# Patient Record
Sex: MALE | Race: WHITE | NOT HISPANIC OR LATINO | Employment: FULL TIME | URBAN - METROPOLITAN AREA
[De-identification: names, ages, dates, MRNs, and addresses within clinical notes are randomized per-mention and may not be internally consistent; named-entity substitution may affect disease eponyms.]

---

## 2022-04-02 ENCOUNTER — OFFICE VISIT (OUTPATIENT)
Dept: URGENT CARE | Facility: URGENT CARE | Age: 27
End: 2022-04-02
Payer: OTHER MISCELLANEOUS

## 2022-04-02 ENCOUNTER — ANCILLARY PROCEDURE (OUTPATIENT)
Dept: GENERAL RADIOLOGY | Facility: CLINIC | Age: 27
End: 2022-04-02
Attending: FAMILY MEDICINE

## 2022-04-02 VITALS
HEART RATE: 55 BPM | SYSTOLIC BLOOD PRESSURE: 114 MMHG | OXYGEN SATURATION: 98 % | DIASTOLIC BLOOD PRESSURE: 64 MMHG | WEIGHT: 211.2 LBS | TEMPERATURE: 97.7 F

## 2022-04-02 DIAGNOSIS — S69.91XA INJURY OF FINGER OF RIGHT HAND, INITIAL ENCOUNTER: ICD-10-CM

## 2022-04-02 DIAGNOSIS — S62.666A CLOSED NONDISPLACED FRACTURE OF DISTAL PHALANX OF RIGHT LITTLE FINGER, INITIAL ENCOUNTER: Primary | ICD-10-CM

## 2022-04-02 PROCEDURE — 99203 OFFICE O/P NEW LOW 30 MIN: CPT | Performed by: FAMILY MEDICINE

## 2022-04-02 PROCEDURE — 73140 X-RAY EXAM OF FINGER(S): CPT | Mod: RT | Performed by: RADIOLOGY

## 2022-04-02 NOTE — PROGRESS NOTES
Assessment & Plan     Injury of finger of right hand, initial encounter   Closed nondisplaced fracture of distal phalanx of right little finger, initial encounter  (primary encounter diagnosis)  Comment: Differential discussed in detail.  X-ray findings reviewed independently, consistent with distal right fifth digit fracture.  Finger splint applied and suggested rest, icing, elevation and over-the-counter analgesia.  Orthopedic referral placed for further review and recommendations.  All questions answered.  Plan: XR Finger Right G/E 2 Views, Orthopedic          Referral          Jorge Luis Fraser MD  [facility]    Fern Rudd is a 26 year old who presents for the following health issues     HPI     Concern -   Onset: yesterday   Description: dropped a box on his right little finger yesterday at work, about 60 pounds  Intensity: moderate  Progression of Symptoms:  same  Accompanying Signs & Symptoms: none  Therapies tried and outcome: None      Review of Systems   Constitutional, HEENT, cardiovascular, pulmonary, gi and gu systems are negative, except as otherwise noted.      Objective    /64   Pulse 55   Temp 97.7  F (36.5  C) (Tympanic)   Wt 95.8 kg (211 lb 3.2 oz)   SpO2 98%   There is no height or weight on file to calculate BMI.  Physical Exam   GENERAL: alert and no distress  MS: Right hand fifth finger mildly swollen with some bruising and tenderness, range of movement normal, normal capillary refills,  SKIN: As above  NEURO: Normal strength and tone, mentation intact and speech normal  PSYCH: mentation appears normal, affect normal/bright    Results for orders placed or performed in visit on 04/02/22   XR Finger Right G/E 2 Views     Status: None (Preliminary result)    Narrative    EXAM: XR FINGER RIGHT G/E 2 VIEW  LOCATION: [facility]  DATE/TIME: 04/02/2022, 12:10 PM    INDICATION: Injury of finger of right hand,  initial encounter.  COMPARISON: None.      Impression    IMPRESSION: Nondisplaced fracture distal phalanx right small finger. No definitive intra-articular extension. No additional small finger fracture or dislocation. Normal joint spaces. Slight flexion at the PIP joint small finger.

## 2022-04-03 ENCOUNTER — HEALTH MAINTENANCE LETTER (OUTPATIENT)
Age: 27
End: 2022-04-03

## 2022-04-04 NOTE — PROGRESS NOTES
Tobin Newell is a 26 year old year old male who presents for evaluation and management of a right side 5th finger injury.    Mechansim: a box fell on his right 5th finger    The injury occurred on 4/1/22    He reports having mild pain/discomfort around the injury site.    Bumps it alot    Treatment:  Treatment: splint, at     Past medical history: See patient history on EMR     Physical Exam  The splint/cast was removed  moderate swelling of the finger.  no deformity of the finger  Tenderness over the distal phalanx right 5th finger  Skin is intact   CMS intact     X-rays: right 5th finger from 4/2  Findings: there is a longitudinal split fracture of the distal phalanx, which extends down from the tip of the phalanx to the metaphysis.  nondisplaced     Assessment:  Right 5th distal phalanx fracture    Closed injury    Plan:  Discussed the treatment options with the patient.    Continue splinting to protect the finger x 4 weeks or so, then can wean off.  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    Pain Control: appropriate doses of OTC Tylenol/NSAIDS discussed, to be used as needed, discussed elevation of the affected extemity above the level of the heart as much as possible to help reduce swelling and apply ice to the affected area as discussed    Return to clinic 4 weeks or as needed     TOMÁS Johnson MD  Dept. Orthopedic Surgery  [facility]

## 2022-04-05 ENCOUNTER — OFFICE VISIT (OUTPATIENT)
Dept: ORTHOPEDICS | Facility: CLINIC | Age: 27
End: 2022-04-05
Payer: OTHER MISCELLANEOUS

## 2022-04-05 DIAGNOSIS — S62.666A CLOSED NONDISPLACED FRACTURE OF DISTAL PHALANX OF RIGHT LITTLE FINGER, INITIAL ENCOUNTER: Primary | ICD-10-CM

## 2022-04-05 DIAGNOSIS — S69.91XA INJURY OF FINGER OF RIGHT HAND, INITIAL ENCOUNTER: ICD-10-CM

## 2022-04-05 PROCEDURE — 99204 OFFICE O/P NEW MOD 45 MIN: CPT | Performed by: ORTHOPAEDIC SURGERY

## 2022-04-05 ASSESSMENT — PAIN SCALES - GENERAL: PAINLEVEL: MODERATE PAIN (4)

## 2022-04-05 NOTE — LETTER
4/5/2022         RE: Tobin Newell  [address] [place] [address]        Dear Colleague,    Thank you for referring your patient, Tobin Newell, to the [facility]. Please see a copy of my visit note below.    Tobin Newell is a 26 year old year old male who presents for evaluation and management of a right side 5th finger injury.    Mechansim: a box fell on his right 5th finger    The injury occurred on 4/1/22    He reports having mild pain/discomfort around the injury site.    Bumps it alot    Treatment:  Treatment: splint, at     Past medical history: See patient history on EMR     Physical Exam  The splint/cast was removed  moderate swelling of the finger.  no deformity of the finger  Tenderness over the distal phalanx right 5th finger  Skin is intact   CMS intact     X-rays: right 5th finger from 4/2  Findings: there is a longitudinal split fracture of the distal phalanx, which extends down from the tip of the phalanx to the metaphysis.  nondisplaced     Assessment:  Right 5th distal phalanx fracture    Closed injury    Plan:  Discussed the treatment options with the patient.    Continue splinting to protect the finger x 4 weeks or so, then can wean off.  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    Pain Control: appropriate doses of OTC Tylenol/NSAIDS discussed, to be used as needed, discussed elevation of the affected extemity above the level of the heart as much as possible to help reduce swelling and apply ice to the affected area as discussed    Return to clinic 4 weeks or as needed     TOMÁS Johnson MD  Dept. Orthopedic Surgery  [facility]                                 Again, thank you for allowing me to participate in the care of your patient.        Sincerely,        Clint Johnson MD

## 2022-10-03 ENCOUNTER — HEALTH MAINTENANCE LETTER (OUTPATIENT)
Age: 27
End: 2022-10-03

## 2023-05-21 ENCOUNTER — HEALTH MAINTENANCE LETTER (OUTPATIENT)
Age: 28
End: 2023-05-21

## 2024-07-28 ENCOUNTER — HEALTH MAINTENANCE LETTER (OUTPATIENT)
Age: 29
End: 2024-07-28